# Patient Record
Sex: MALE | Race: BLACK OR AFRICAN AMERICAN | NOT HISPANIC OR LATINO | URBAN - NONMETROPOLITAN AREA
[De-identification: names, ages, dates, MRNs, and addresses within clinical notes are randomized per-mention and may not be internally consistent; named-entity substitution may affect disease eponyms.]

---

## 2017-01-13 NOTE — PATIENT DISCUSSION
Will have Dr Neely Memory evaluate to see if it is necessary to remove the small lens fragment or if it is OK to cont to treat with steroids for now.

## 2017-01-13 NOTE — PATIENT DISCUSSION
- this is target -1.50 eye, excellent visual outcome, mild inflammation remains, very small fragment in vit remains, sypmtomatic to pt

## 2017-01-13 NOTE — PATIENT DISCUSSION
POM#1 S/P Complex CE/IOL OS with 2-3 clock hours of zonulysis IN and small post circular rent, anterior vitrectomy, sulcus IOL stable and centered

## 2017-03-08 NOTE — PATIENT DISCUSSION
- Will leave sutures for now given such good vision, none loose. Would like to take out the para suture OS and possibly one main incision suture next visit.

## 2017-03-08 NOTE — PATIENT DISCUSSION
Complex CE/IOL OS with 2-3 clock hours of zonulysis IN and small post circular rent, anterior vitrectomy, sulcus IOL

## 2021-02-09 NOTE — PATIENT DISCUSSION
New Prescription: Isabelle De Dios (brinzolamide-brimonidine): drops,suspension: 1-0.2% 1 drop twice a day as directed into left eye

## 2021-02-09 NOTE — PATIENT DISCUSSION
DISLOCATED PCIOL OS: POSTERIORLY WITH ELEVATED IOP. PRESCRIBE SIMBRINZA BID OS (SAMPLE GIVEN) AND REFER TO DR. Yarelis Garcia FOR EVALUATION AND MANAGEMENT.

## 2021-02-10 NOTE — PATIENT DISCUSSION
RETURN NEXT TO DR Lorelei GOLD FOR APHAKIC CONTACT LENS FITTING OS. TARGET REFRACTIVE ERROR -2.00 OS FOR NEAR VISION.

## 2021-02-10 NOTE — PATIENT DISCUSSION
Continue: Jesica Moreno (brinzolamide-brimonidine): drops,suspension: 1-0.2% 1 drop twice a day as directed into left eye

## 2021-02-10 NOTE — PATIENT DISCUSSION
OCULAR HTN OS:  IOP DECREASED, STROMAL EDEMA DECREASED. CONTINUE SIMBRINZA 2X/DAY OS UNTIL NEXT VISIT FOR IOL CALCULATIONS IN ORDER TO ALLOW STROMAL EDEMA TO CLEAR.

## 2021-02-11 NOTE — PATIENT DISCUSSION
APHAKIA OS: EDUCATED PATIENT ON FINDINGS. ORDER MEDICALLY NECESSARY CL TRIAL OS. RTC WHEN TRIALS ARRIVE FOR CL CHECK. WILL CONSIDER SPECIALTY SOFT LENS OR RGP IF TRIAL ORDERED NOT TOLERATED WELL.

## 2021-02-11 NOTE — PATIENT DISCUSSION
Continue: Damian Nick (brinzolamide-brimonidine): drops,suspension: 1-0.2% 1 drop twice a day as directed into left eye

## 2021-02-15 NOTE — PATIENT DISCUSSION
Continue: Rhedeangelo Danika (brinzolamide-brimonidine): drops,suspension: 1-0.2% 1 drop twice a day as directed into left eye

## 2021-02-17 NOTE — PATIENT DISCUSSION
Continue: Rosi Arias (brinzolamide-brimonidine): drops,suspension: 1-0.2% 1 drop twice a day as directed into left eye

## 2021-02-17 NOTE — PATIENT DISCUSSION
APHAKIA OS: CL TRIAL/RX RELEASED TO PATIENT. DISCUSSED PROPER WEAR/CARE/HYGIENE OF CLS. RTC IF DISCOMFORT.

## 2021-05-17 NOTE — PATIENT DISCUSSION
New Prescription: prednisolone acetate (prednisolone acetate): drops,suspension: 1% 1 [facility]

## 2021-05-17 NOTE — PATIENT DISCUSSION
LETTER TO DR Jennifer Santillan: [address] [facility] WITH MAC ANESTHESIA. PLEASE FAX CLINICAL NOTE [phone].

## 2021-05-17 NOTE — PATIENT DISCUSSION
Considering  Surgery Counseling: I have discussed the option of scheduling surgery versus following, as well as the risks, benefits and alternatives of IOL surgery with the patient. It was explained that the surgery is elective, there is no rush and there is no harm in waiting to have surgery. It was also explained that there is no guarantee that removing the cataract will improve their vision.

## 2021-05-17 NOTE — PATIENT DISCUSSION
New Prescription: ciprofloxacin (ciprofloxacin): drops: 0.3% 1 drop four times a day as directed [facility]

## 2021-05-17 NOTE — PATIENT DISCUSSION
POSTERIOR DISLOCATION OF LENS, OS: SCHEDULE PPV SURGERY WITH IOL EXCHANGE AND SCLERAL FIXATION IOL OS.

## 2021-05-17 NOTE — PATIENT DISCUSSION
Stopped Today: Simbrinza (brinzolamide-brimonidine): drops,suspension: 1-0.2% 1 drop twice a day as directed into left eye

## 2021-07-21 NOTE — PATIENT DISCUSSION
POST-OP DAY 1 EXAM: S/P PPV/IOLX/SFIOL OS; Doing well today. Retina attached. Start eyedrops in the surgical eye as instructed: Pred 4x/day, Cipro 4x/day, Atropine 2x/day. Take tylenol or ibuprofen for any mild eye pain or pressure. Retinal detachment and endophthalmitis precautions reviewed. Instructed to call immediately for worsening vision, eye pain, or eye discharge.

## 2021-07-21 NOTE — PATIENT DISCUSSION
CORNEAL EPITHELIAL DEFECT OS:  BANDAGE CONTACT LENS PLACED. REMOVE AT NEXT VISIT. PATIENT INSTRUCTED TO DISCARD BCL IF IT FALLS OUT AT HOME.

## 2021-07-30 NOTE — PATIENT DISCUSSION
Continue: Cosopt (dorzolamide-timolol): drops: 2-0.5% 1 drop twice a day as directed into affected eye 07-

## 2021-07-30 NOTE — PATIENT DISCUSSION
POST-OP WEEK 1 EXAM S/P PPV/IOLX/SFIOL OS : Doing well today. Retina attached. IOP within acceptable limits. CONTINUE PRED FORTE 4X/DAY OS, UNTIL NEXT VISIT. Discontinue Cipro and Atropine. Corneal epi defect resolved and bandage contact lens discarded. Retinal detachment and endophthalmitis precautions reviewed. Instructed to call immediately for worsening vision, eye pain, or eye discharge.

## 2021-07-30 NOTE — PATIENT DISCUSSION
Continue: Jenifer BORDEN (brimonidine): drops: 0.1% 1 drop twice a day as directed into affected eye 07-

## 2021-07-30 NOTE — PATIENT DISCUSSION
OCULAR HTN OS: CONTINUE LATANOPROST 1X/DAY OS (NEW RX GIVEN), COSOPT 2X/DAY OS, ALPHAGAN 2X/DAY OS. RETURN FOR FOLLOW UP AS SCHEDULED.

## 2021-07-30 NOTE — PATIENT DISCUSSION
Continue: prednisolone acetate (prednisolone acetate): drops,suspension: 1% 1 drop as directed opht 07-

## 2021-07-30 NOTE — PATIENT DISCUSSION
New Prescription: ciprofloxacin (ciprofloxacin): drops: 0.3% 1 drop four times a day as directed [facility]

## 2021-08-19 NOTE — PATIENT DISCUSSION
POST-OP MONTH 1 EXAM S/P PPV/IOLX/SFIOL OS : Doing well today. Retina attached. IOP within acceptable limits. RECOMMEND SLOW PRED FORTE 3/2/1 MONTHLY TAPER OS. Retinal detachment and endophthalmitis precautions reviewed. Instructed to call immediately for worsening vision, eye pain, or eye discharge.

## 2021-08-19 NOTE — PATIENT DISCUSSION
New Prescription: prednisolone acetate (prednisolone acetate): drops,suspension: 1% 1 drop as directed opht 08-

## 2021-08-19 NOTE — PATIENT DISCUSSION
Stopped Today: ciprofloxacin (ciprofloxacin): drops: 0.3% 1 drop four times a day as directed opht 07-

## 2021-08-19 NOTE — PATIENT DISCUSSION
Stopped Today: prednisolone acetate (prednisolone acetate): drops,suspension: 1% 1 drop as directed opht 07-

## 2021-09-27 NOTE — PATIENT DISCUSSION
Continue: prednisolone acetate (prednisolone acetate): drops,suspension: 1% 1 drop as directed opht 08-

## 2021-09-27 NOTE — PATIENT DISCUSSION
POST-OP MONTH 2 EXAM S/P PPV/IOLX/SFIOL OS : Doing well today. Retina attached. IOP within acceptable limits. TAPER PRED FORTE 2X/DAY FOR 1 WEEK, 1X/DAY FOR 2 WEEKS, THEN STOP. Retinal detachment and endophthalmitis precautions reviewed. Instructed to call immediately for worsening vision, eye pain, or eye discharge.

## 2022-03-14 NOTE — PATIENT DISCUSSION
Patient understands condition, prognosis, risk of permanent vision loss, and need for follow up care.

## 2022-07-15 ENCOUNTER — CONSULTATION/EVALUATION (OUTPATIENT)
Facility: LOCATION | Age: 62
End: 2022-07-15

## 2022-07-15 VITALS — BODY MASS INDEX: 37.38 KG/M2 | WEIGHT: 267 LBS | HEIGHT: 71 IN

## 2022-07-15 DIAGNOSIS — H26.492: ICD-10-CM

## 2022-07-15 DIAGNOSIS — H25.811: ICD-10-CM

## 2022-07-15 DIAGNOSIS — H25.89: ICD-10-CM

## 2022-07-15 DIAGNOSIS — Z96.1: ICD-10-CM

## 2022-07-15 PROCEDURE — 92136 OPHTHALMIC BIOMETRY: CPT

## 2022-07-15 PROCEDURE — 99204 OFFICE O/P NEW MOD 45 MIN: CPT

## 2022-07-15 PROCEDURE — 76512 OPH US DX B-SCAN: CPT

## 2022-07-15 ASSESSMENT — VISUAL ACUITY
OD_CC: CF 3FT
OD_SC: CF 1FT

## 2022-07-15 ASSESSMENT — TONOMETRY
OD_IOP_MMHG: 9
OS_IOP_MMHG: 8

## 2022-09-23 ENCOUNTER — SURGERY/PROCEDURE (OUTPATIENT)
Facility: LOCATION | Age: 62
End: 2022-09-23

## 2022-09-23 DIAGNOSIS — H25.811: ICD-10-CM

## 2022-09-23 PROCEDURE — 6698454 REMOVE CATARACT;INSERT LENS (SX ONLY)
